# Patient Record
Sex: MALE | URBAN - METROPOLITAN AREA
[De-identification: names, ages, dates, MRNs, and addresses within clinical notes are randomized per-mention and may not be internally consistent; named-entity substitution may affect disease eponyms.]

---

## 2018-09-19 ENCOUNTER — APPOINTMENT (RX ONLY)
Dept: URBAN - METROPOLITAN AREA CLINIC 86 | Facility: CLINIC | Age: 58
Setting detail: DERMATOLOGY
End: 2018-09-19

## 2018-09-19 DIAGNOSIS — L23.9 ALLERGIC CONTACT DERMATITIS, UNSPECIFIED CAUSE: ICD-10-CM | Status: WORSENING

## 2018-09-19 PROBLEM — L30.9 DERMATITIS, UNSPECIFIED: Status: ACTIVE | Noted: 2018-09-19

## 2018-09-19 PROCEDURE — ? SEPARATE AND IDENTIFIABLE DOCUMENTATION

## 2018-09-19 PROCEDURE — ? PRESCRIPTION

## 2018-09-19 PROCEDURE — ? KOH PREP

## 2018-09-19 PROCEDURE — 96372 THER/PROPH/DIAG INJ SC/IM: CPT

## 2018-09-19 PROCEDURE — 99203 OFFICE O/P NEW LOW 30 MIN: CPT | Mod: 25

## 2018-09-19 PROCEDURE — ? ADDITIONAL NOTES

## 2018-09-19 PROCEDURE — ? COUNSELING

## 2018-09-19 PROCEDURE — ? INTRAMUSCULAR KENALOG

## 2018-09-19 RX ORDER — TRIAMCINOLONE ACETONIDE 1 MG/G
CREAM TOPICAL
Qty: 1 | Refills: 1 | Status: ERX | COMMUNITY
Start: 2018-09-19

## 2018-09-19 RX ADMIN — TRIAMCINOLONE ACETONIDE: 1 CREAM TOPICAL at 00:00

## 2018-09-19 ASSESSMENT — LOCATION SIMPLE DESCRIPTION DERM
LOCATION SIMPLE: ABDOMEN
LOCATION SIMPLE: SCALP
LOCATION SIMPLE: RIGHT LOWER BACK
LOCATION SIMPLE: RIGHT BUTTOCK
LOCATION SIMPLE: POSTERIOR NECK

## 2018-09-19 ASSESSMENT — LOCATION ZONE DERM
LOCATION ZONE: NECK
LOCATION ZONE: SCALP
LOCATION ZONE: TRUNK

## 2018-09-19 ASSESSMENT — LOCATION DETAILED DESCRIPTION DERM
LOCATION DETAILED: RIGHT INFERIOR POSTERIOR NECK
LOCATION DETAILED: RIGHT BUTTOCK
LOCATION DETAILED: RIGHT INFERIOR LATERAL LOWER BACK
LOCATION DETAILED: LEFT LATERAL ABDOMEN
LOCATION DETAILED: RIGHT CENTRAL FRONTAL SCALP

## 2018-09-19 ASSESSMENT — PAIN INTENSITY VAS: HOW INTENSE IS YOUR PAIN 0 BEING NO PAIN, 10 BEING THE MOST SEVERE PAIN POSSIBLE?: NO PAIN

## 2018-09-19 ASSESSMENT — SEVERITY ASSESSMENT: SEVERITY: MODERATE

## 2018-09-19 NOTE — PROCEDURE: MIPS QUALITY
Quality 111:Pneumonia Vaccination Status For Older Adults: Pneumococcal Vaccination not Administered or Previously Received, Reason not Otherwise Specified
Quality 110: Preventive Care And Screening: Influenza Immunization: Influenza Immunization Administered during Influenza season
Detail Level: Detailed
Quality 130: Documentation Of Current Medications In The Medical Record: Current Medications Documented

## 2018-09-19 NOTE — PROCEDURE: INTRAMUSCULAR KENALOG
Kenalog Preparation: kenalog
Add Option For Additional Mediation: No
Total Volume (Ccs): 1.3
Concentration (Mg/Ml): 40.0
Consent: The risks of atrophy were reviewed with the patient.
Detail Level: None
Concentration (Mg/Ml) Of Additional Medication: 2.5

## 2018-09-19 NOTE — PROCEDURE: ADDITIONAL NOTES
Detail Level: Simple
Additional Notes: Patient has an asymmetric rash and showed negative findings on the KOH for scabies. His partner has had scabies in the past, and she is not itching despite close physical contact for the last month. He will return in one month if he is not clear, and will return sooner if he worsens.

## 2018-10-03 ENCOUNTER — APPOINTMENT (RX ONLY)
Dept: URBAN - METROPOLITAN AREA CLINIC 86 | Facility: CLINIC | Age: 58
Setting detail: DERMATOLOGY
End: 2018-10-03

## 2018-10-03 DIAGNOSIS — B86 SCABIES: ICD-10-CM

## 2018-10-03 PROBLEM — L30.9 DERMATITIS, UNSPECIFIED: Status: ACTIVE | Noted: 2018-10-03

## 2018-10-03 PROCEDURE — ? TREATMENT REGIMEN

## 2018-10-03 PROCEDURE — 99213 OFFICE O/P EST LOW 20 MIN: CPT

## 2018-10-03 PROCEDURE — ? COUNSELING

## 2018-10-03 PROCEDURE — ? PRESCRIPTION

## 2018-10-03 RX ORDER — TRIAMCINOLONE ACETONIDE 1 MG/G
CREAM TOPICAL
Qty: 1 | Refills: 0 | Status: ERX

## 2018-10-03 RX ORDER — PERMETHRIN 50 MG/G
CREAM TOPICAL
Qty: 1 | Refills: 0 | Status: ERX | COMMUNITY
Start: 2018-10-03

## 2018-10-03 RX ADMIN — PERMETHRIN: 50 CREAM TOPICAL at 00:00

## 2018-10-03 ASSESSMENT — LOCATION DETAILED DESCRIPTION DERM
LOCATION DETAILED: LEFT AXILLARY VAULT
LOCATION DETAILED: PERIUMBILICAL SKIN
LOCATION DETAILED: RIGHT PROXIMAL POSTERIOR THIGH
LOCATION DETAILED: LEFT PROXIMAL POSTERIOR THIGH
LOCATION DETAILED: RIGHT AXILLARY VAULT
LOCATION DETAILED: LEFT PROXIMAL DORSAL FOREARM
LOCATION DETAILED: RIGHT PROXIMAL DORSAL FOREARM

## 2018-10-03 ASSESSMENT — LOCATION SIMPLE DESCRIPTION DERM
LOCATION SIMPLE: LEFT FOREARM
LOCATION SIMPLE: LEFT POSTERIOR THIGH
LOCATION SIMPLE: LEFT AXILLARY VAULT
LOCATION SIMPLE: RIGHT AXILLARY VAULT
LOCATION SIMPLE: RIGHT FOREARM
LOCATION SIMPLE: RIGHT POSTERIOR THIGH
LOCATION SIMPLE: ABDOMEN

## 2018-10-03 ASSESSMENT — LOCATION ZONE DERM
LOCATION ZONE: LEG
LOCATION ZONE: ARM
LOCATION ZONE: TRUNK
LOCATION ZONE: AXILLAE

## 2018-10-03 NOTE — PROCEDURE: TREATMENT REGIMEN
Continue Regimen: TAC 0.1% cream bid weekdays
Detail Level: Zone
Otc Regimen: Sarna anti itch lotion
Plan: Dermatographism negative.\\nWash bedding in warm water. Repeat in 1 week
Initiate Treatment: Permethrin 5% cream - rx given to wife as well

## 2018-10-10 ENCOUNTER — APPOINTMENT (RX ONLY)
Dept: URBAN - METROPOLITAN AREA CLINIC 86 | Facility: CLINIC | Age: 58
Setting detail: DERMATOLOGY
End: 2018-10-10

## 2018-10-10 DIAGNOSIS — B09 UNSPECIFIED VIRAL INFECTION CHARACTERIZED BY SKIN AND MUCOUS MEMBRANE LESIONS: ICD-10-CM

## 2018-10-10 PROBLEM — L30.9 DERMATITIS, UNSPECIFIED: Status: ACTIVE | Noted: 2018-10-10

## 2018-10-10 PROCEDURE — 11100: CPT

## 2018-10-10 PROCEDURE — ? COUNSELING

## 2018-10-10 PROCEDURE — ? BIOPSY BY PUNCH METHOD

## 2018-10-10 PROCEDURE — ? TREATMENT REGIMEN

## 2018-10-10 ASSESSMENT — LOCATION ZONE DERM
LOCATION ZONE: TRUNK
LOCATION ZONE: LEG

## 2018-10-10 ASSESSMENT — LOCATION SIMPLE DESCRIPTION DERM
LOCATION SIMPLE: CHEST
LOCATION SIMPLE: RIGHT THIGH

## 2018-10-10 ASSESSMENT — LOCATION DETAILED DESCRIPTION DERM
LOCATION DETAILED: RIGHT ANTERIOR DISTAL THIGH
LOCATION DETAILED: LEFT LATERAL SUPERIOR CHEST

## 2018-10-10 NOTE — PROCEDURE: BIOPSY BY PUNCH METHOD
Was A Bandage Applied: Yes
Detail Level: Detailed
Render Post-Care Instructions In Note?: no
Anesthesia Volume In Cc (Will Not Render If 0): 0.5
Billing Type: Third-Party Bill
Suture Removal: 14 days
Biopsy Type: H and E
Wound Care: Polysporin ointment
X Size Of Lesion In Cm (Optional): 0
Anesthesia Type: 1% lidocaine with epinephrine
Punch Size In Mm: 3
Hemostasis: None
Dressing: bandage
Consent: Verbal consent was obtained and risks were reviewed including but not limited to scarring, infection, bleeding, scabbing, incomplete removal, nerve damage and allergy to anesthesia.
Post-Care Instructions: I reviewed with the patient in detail post-care instructions. Patient is to keep the biopsy site dry overnight, and then apply Vaseline daily until healed.
Home Suture Removal Text: Patient was provided a home suture removal kit and will remove their sutures at home.  If they have any questions or difficulties they will call the office.
Epidermal Sutures: 5-0 Ethilon
Lab: [id number]
Notification Instructions: Patient will be notified of biopsy results. However, patient instructed to call the office if not contacted within 2 weeks.

## 2018-10-16 ENCOUNTER — APPOINTMENT (RX ONLY)
Dept: URBAN - METROPOLITAN AREA CLINIC 86 | Facility: CLINIC | Age: 58
Setting detail: DERMATOLOGY
End: 2018-10-16

## 2018-10-16 DIAGNOSIS — B09 UNSPECIFIED VIRAL INFECTION CHARACTERIZED BY SKIN AND MUCOUS MEMBRANE LESIONS: ICD-10-CM | Status: IMPROVED

## 2018-10-16 DIAGNOSIS — Z48.02 ENCOUNTER FOR REMOVAL OF SUTURES: ICD-10-CM

## 2018-10-16 PROBLEM — L30.9 DERMATITIS, UNSPECIFIED: Status: ACTIVE | Noted: 2018-10-16

## 2018-10-16 PROCEDURE — ? SUTURE REMOVAL (GLOBAL PERIOD)

## 2018-10-16 PROCEDURE — 99213 OFFICE O/P EST LOW 20 MIN: CPT

## 2018-10-16 PROCEDURE — ? COUNSELING

## 2018-10-16 PROCEDURE — ? TREATMENT REGIMEN

## 2018-10-16 ASSESSMENT — LOCATION ZONE DERM
LOCATION ZONE: LEG
LOCATION ZONE: TRUNK

## 2018-10-16 ASSESSMENT — LOCATION SIMPLE DESCRIPTION DERM
LOCATION SIMPLE: RIGHT THIGH
LOCATION SIMPLE: CHEST

## 2018-10-16 ASSESSMENT — LOCATION DETAILED DESCRIPTION DERM
LOCATION DETAILED: LEFT LATERAL SUPERIOR CHEST
LOCATION DETAILED: RIGHT ANTERIOR DISTAL THIGH

## 2018-10-16 ASSESSMENT — PAIN INTENSITY VAS: HOW INTENSE IS YOUR PAIN 0 BEING NO PAIN, 10 BEING THE MOST SEVERE PAIN POSSIBLE?: NO PAIN

## 2018-10-16 NOTE — PROCEDURE: TREATMENT REGIMEN
Otc Regimen: Sarna lotion
Detail Level: Zone
Plan: Discussed patch testing if not improving
Continue Regimen: TAC 0.1% Cream BID with wet wraps

## 2018-10-16 NOTE — PROCEDURE: SUTURE REMOVAL (GLOBAL PERIOD)
Add 46543 Cpt? (Important Note: In 2017 The Use Of 51256 Is Being Tracked By Cms To Determine Future Global Period Reimbursement For Global Periods): no
Detail Level: Detailed

## 2018-12-26 ENCOUNTER — RX ONLY (OUTPATIENT)
Age: 58
Setting detail: RX ONLY
End: 2018-12-26

## 2018-12-26 ENCOUNTER — APPOINTMENT (RX ONLY)
Dept: URBAN - METROPOLITAN AREA CLINIC 86 | Facility: CLINIC | Age: 58
Setting detail: DERMATOLOGY
End: 2018-12-26

## 2018-12-26 DIAGNOSIS — L259 CONTACT DERMATITIS AND OTHER ECZEMA, UNSPECIFIED CAUSE: ICD-10-CM

## 2018-12-26 PROBLEM — L23.9 ALLERGIC CONTACT DERMATITIS, UNSPECIFIED CAUSE: Status: ACTIVE | Noted: 2018-12-26

## 2018-12-26 PROCEDURE — ? PRESCRIPTION

## 2018-12-26 PROCEDURE — ? COUNSELING

## 2018-12-26 PROCEDURE — ? TREATMENT REGIMEN

## 2018-12-26 PROCEDURE — 99213 OFFICE O/P EST LOW 20 MIN: CPT

## 2018-12-26 RX ORDER — BETAMETHASONE DIPROPIONATE 0.5 MG/G
CREAM TOPICAL
Qty: 30 | Refills: 0 | Status: ERX | COMMUNITY
Start: 2018-12-26

## 2018-12-26 RX ORDER — BETAMETHASONE DIPROPIONATE 0.5 MG/G
CREAM TOPICAL
Qty: 30 | Refills: 0 | Status: ERX

## 2018-12-26 RX ADMIN — BETAMETHASONE DIPROPIONATE: 0.5 CREAM TOPICAL at 15:41

## 2018-12-26 ASSESSMENT — LOCATION DETAILED DESCRIPTION DERM
LOCATION DETAILED: LEFT INFERIOR MEDIAL LOWER BACK
LOCATION DETAILED: RIGHT INFERIOR MEDIAL LOWER BACK
LOCATION DETAILED: HAIR

## 2018-12-26 ASSESSMENT — LOCATION SIMPLE DESCRIPTION DERM
LOCATION SIMPLE: HAIR
LOCATION SIMPLE: RIGHT LOWER BACK
LOCATION SIMPLE: LEFT LOWER BACK

## 2018-12-26 ASSESSMENT — LOCATION ZONE DERM
LOCATION ZONE: SCALP
LOCATION ZONE: TRUNK

## 2018-12-26 ASSESSMENT — SEVERITY ASSESSMENT: SEVERITY: MILD TO MODERATE

## 2018-12-26 ASSESSMENT — PAIN INTENSITY VAS: HOW INTENSE IS YOUR PAIN 0 BEING NO PAIN, 10 BEING THE MOST SEVERE PAIN POSSIBLE?: NO PAIN

## 2018-12-26 NOTE — PROCEDURE: TREATMENT REGIMEN
Initiate Treatment: Betamethasone dipropionate cream BID x 2 wks then change back to TAC 0.1% Cream BID weekdays only
Plan: Change hair products to Free and Clear \\nChange to Dove bar soap for sensitive skin\\nPatch test scheduled in 2 wks\\nVery suspicious of hair products, including hair dye.
Detail Level: Zone

## 2019-01-07 ENCOUNTER — APPOINTMENT (RX ONLY)
Dept: URBAN - METROPOLITAN AREA CLINIC 86 | Facility: CLINIC | Age: 59
Setting detail: DERMATOLOGY
End: 2019-01-07

## 2019-01-07 DIAGNOSIS — L259 CONTACT DERMATITIS AND OTHER ECZEMA, UNSPECIFIED CAUSE: ICD-10-CM

## 2019-01-07 PROBLEM — L23.9 ALLERGIC CONTACT DERMATITIS, UNSPECIFIED CAUSE: Status: ACTIVE | Noted: 2019-01-07

## 2019-01-07 PROCEDURE — ? PATCH TESTING

## 2019-01-07 PROCEDURE — 95044 PATCH/APPLICATION TESTS: CPT

## 2019-01-07 PROCEDURE — ? COUNSELING

## 2019-01-07 ASSESSMENT — LOCATION DETAILED DESCRIPTION DERM: LOCATION DETAILED: LEFT MID-UPPER BACK

## 2019-01-07 ASSESSMENT — LOCATION SIMPLE DESCRIPTION DERM: LOCATION SIMPLE: LEFT UPPER BACK

## 2019-01-07 ASSESSMENT — LOCATION ZONE DERM: LOCATION ZONE: TRUNK

## 2019-01-07 NOTE — PROCEDURE: PATCH TESTING
Number Of Patches (Maximum Allowable Per Dos By Cms Is 90): 80
Consent: Written consent obtained, risks reviewed including but not limited to rash, itching, allergic reaction, systemic rash, remote possiblity of anaphylaxis to allergen.
Detail Level: None
Post-Care Instructions: I reviewed with the patient in detail post-care instructions. Patient should not sweat, pick at, or get the patches wet for 48 hours.

## 2019-01-09 ENCOUNTER — APPOINTMENT (RX ONLY)
Dept: URBAN - METROPOLITAN AREA CLINIC 86 | Facility: CLINIC | Age: 59
Setting detail: DERMATOLOGY
End: 2019-01-09

## 2019-01-09 DIAGNOSIS — L259 CONTACT DERMATITIS AND OTHER ECZEMA, UNSPECIFIED CAUSE: ICD-10-CM

## 2019-01-09 PROBLEM — L23.9 ALLERGIC CONTACT DERMATITIS, UNSPECIFIED CAUSE: Status: ACTIVE | Noted: 2019-01-09

## 2019-01-09 PROCEDURE — 99212 OFFICE O/P EST SF 10 MIN: CPT

## 2019-01-09 PROCEDURE — ? CORE ACDS PATCH TEST READING

## 2019-01-09 NOTE — PROCEDURE: CORE ACDS PATCH TEST READING
Allergen 62 Reaction: no reaction
Name Of Allergen 60: Benzalkonium chloride
Name Of Allergen 47: Lavender absolute
Detail Level: Zone
Name Of Allergen 55: Benzophenone-3 (2-Hydroxy-4-methoxybenzophenone)
Name Of Allergen 46: Methyl methacrylate
Name Of Allergen 76: Disperse Orange 3
Name Of Allergen 39: Polymyxin B
Name Of Allergen 15: Carba Mix
Name Of Allergen 9: Methylisothiazolinone
Name Of Allergen 37: Propylene glycol
Name Of Allergen 74: Ethyl cyanoacrylate
Name Of Allergen 43: HEMA Hydroxyethyl methacrylate
Name Of Allergen 32: Mercaptobenzothiazole
Name Of Allergen 51: Tea tree oil
What Reading Time Point?: 48 hour
Name Of Allergen 56: Tosylamide formaldehyde resin
Allergen 10 Reaction: +/-
Name Of Allergen 62: Sodium benzoate
Name Of Allergen 28: Gold sodium thiosulfate
Name Of Allergen 35: Disperse Blue 106/124 Mix
Name Of Allergen 73: Amidoamine
Name Of Allergen 45: Decyl glucoside
Name Of Allergen 16: Black Rubber Mix
Number Of Patches Read: 80
Name Of Allergen 61: Benzophenone-4 (2-Hydroxy-4-methoxybenzophenone-5)
Name Of Allergen 38: Iodopropynyl butylcarbamate
Name Of Allergen 66: Ethyleneurea melanine-formaldehyde
Name Of Allergen 18: Quaternium 15 (Dowicil 200)
Name Of Allergen 80: Benzyl alcohol
Name Of Allergen 14: Epoxy Resin (Bisphenol A)
Show Negative Results In The Note?: Yes
Name Of Allergen 49: Tocopherol
Name Of Allergen 21: Formaldehyde
Name Of Allergen 22: Mercapto Mix
Name Of Allergen 10: Balsam of [place] (Myroxylon pereirae)
Name Of Allergen 31: Hydrocortisone-17-butyrate
Name Of Allergen 57: Sesquiterpene lactone Mix
Name Of Allergen 77: Benzoic acid
Name Of Allergen 6: Fragrance Mix I
Name Of Allergen 64: Ylang-Ylang oil (Cananga odorata)
Name Of Allergen 48: Cinnamal - Cinnamic aldehyde
Name Of Allergen 52: Chlorhexidine digluconate
Name Of Allergen 67: Sorbitan sesquioleate
Name Of Allergen 13: s-gjpy-Mgqhqbdkeop formaldehyde resin
Name Of Allergen 4: Potassium dichromate
Name Of Allergen 7: Colophony
Show Allergen Counseling In The Note?: No
Name Of Allergen 68: n,n-Diphenylguanidine
Name Of Allergen 63: Sorbic acid
Name Of Allergen 1: Nickel
Name Of Allergen 2: Lanolin (Amerchol 101)
Name Of Allergen 70: Ethylhexylglycerin
Name Of Allergen 36: Lidocaine
Name Of Allergen 25: Diazolidinyl urea (Germall II)
Name Of Allergen 34: Fragrance Mix II
Name Of Allergen 8: Paraben Mix
Name Of Allergen 58: Cocamide BLAYNE (coconut BLAYNE)
Name Of Allergen 5: DMDM Hydantoin
Name Of Allergen 65: Compositae Mix
Name Of Allergen 24: Thiuram Mix
Name Of Allergen 75: Phenoxyethanol
Name Of Allergen 11: Ethylenediamine dihydrochloride
Name Of Allergen 27: Eifstycsgt-[id number]-damqscxr
Name Of Allergen 71: Triamcinolone
Name Of Allergen 59: 4-Chloro-3-cresol (PCMC)
Name Of Allergen 78: BHT (2-Ditert-butyl-4-cresol)
Name Of Allergen 53: Propolis
Name Of Allergen 40: Cocamidopropyl betaine
Name Of Allergen 12: Cobalt chloride
Name Of Allergen 30: Budesonide
Name Of Allergen 33: Bacitracin
Name Of Allergen 26: Benzocaine
Name Of Allergen 44: Oleamidopropyl dimethylamine
Name Of Allergen 54: Chloroxylenol (PCMX)
Name Of Allergen 3: Neomycin
Name Of Allergen 19: MDBGN (Methyldibromoglutaronitrile)
Name Of Allergen 17: Methylchlorisothiazolinone/Methylisothiazolinone
Name Of Allergen 41: Mixed dialkyl thioureas
Name Of Allergen 69: Cetyl steryl alcohol
Name Of Allergen 72: Clobetasol-17 propionate
Name Of Allergen 42: 3-(Dimethylamino)-propylamine
Name Of Allergen 20: p-Phenylenediamine
Name Of Allergen 50: Ethyl acrylate
Name Of Allergen 29: Imidazolidinyl urea (Germal 115)
Name Of Allergen 23: Bronopol 2-Bromo-2-nitropropane-1,3-diol
Name Of Allergen 79: 2-Ethylhexyl-4-methoxycinnamate (Parsol MCX)

## 2019-01-10 ENCOUNTER — APPOINTMENT (RX ONLY)
Dept: URBAN - METROPOLITAN AREA CLINIC 86 | Facility: CLINIC | Age: 59
Setting detail: DERMATOLOGY
End: 2019-01-10

## 2019-01-10 DIAGNOSIS — L259 CONTACT DERMATITIS AND OTHER ECZEMA, UNSPECIFIED CAUSE: ICD-10-CM

## 2019-01-10 PROBLEM — L23.9 ALLERGIC CONTACT DERMATITIS, UNSPECIFIED CAUSE: Status: ACTIVE | Noted: 2019-01-10

## 2019-01-10 PROCEDURE — ? ADDITIONAL NOTES

## 2019-01-10 PROCEDURE — ? COUNSELING ALLERGENS

## 2019-01-10 PROCEDURE — ? CORE ACDS PATCH TEST READING

## 2019-01-10 PROCEDURE — 99213 OFFICE O/P EST LOW 20 MIN: CPT

## 2019-01-10 NOTE — PROCEDURE: CORE ACDS PATCH TEST READING
Name Of Allergen 36: Lidocaine
Allergen 83 Reaction: no reaction
Name Of Allergen 65: Compositae Mix
Name Of Allergen 29: Imidazolidinyl urea (Germal 115)
Name Of Allergen 12: Cobalt chloride
Name Of Allergen 40: Cocamidopropyl betaine
Name Of Allergen 16: Black Rubber Mix
Name Of Allergen 53: Propolis
What Reading Time Point?: 72 hour
Name Of Allergen 57: Sesquiterpene lactone Mix
Name Of Allergen 77: Benzoic acid
Name Of Allergen 72: Clobetasol-17 propionate
Name Of Allergen 73: Amidoamine
Name Of Allergen 76: Disperse Orange 3
Name Of Allergen 18: Quaternium 15 (Dowicil 200)
Name Of Allergen 10: Balsam of [place] (Myroxylon pereirae)
Number Of Patches Read: 80
Name Of Allergen 71: Triamcinolone
Name Of Allergen 34: Fragrance Mix II
Name Of Allergen 47: Lavender absolute
Name Of Allergen 14: Epoxy Resin (Bisphenol A)
Name Of Allergen 3: Neomycin
Name Of Allergen 35: Disperse Blue 106/124 Mix
Name Of Allergen 67: Sorbitan sesquioleate
Allergen 78 Reaction: 1+
Name Of Allergen 19: MDBGN (Methyldibromoglutaronitrile)
Name Of Allergen 74: Ethyl cyanoacrylate
Name Of Allergen 54: Chloroxylenol (PCMX)
Name Of Allergen 4: Potassium dichromate
Name Of Allergen 23: Bronopol 2-Bromo-2-nitropropane-1,3-diol
Name Of Allergen 11: Ethylenediamine dihydrochloride
Show Allergen Counseling In The Note?: Yes
Name Of Allergen 62: Sodium benzoate
Name Of Allergen 50: Ethyl acrylate
Name Of Allergen 38: Iodopropynyl butylcarbamate
Name Of Allergen 37: Propylene glycol
Name Of Allergen 21: Formaldehyde
Name Of Allergen 58: Cocamide BLAYNE (coconut BLAYNE)
Name Of Allergen 78: BHT (2-Ditert-butyl-4-cresol)
Name Of Allergen 61: Benzophenone-4 (2-Hydroxy-4-methoxybenzophenone-5)
Name Of Allergen 1: Nickel
Name Of Allergen 24: Thiuram Mix
Name Of Allergen 46: Methyl methacrylate
Name Of Allergen 26: Benzocaine
Name Of Allergen 25: Diazolidinyl urea (Germall II)
Name Of Allergen 55: Benzophenone-3 (2-Hydroxy-4-methoxybenzophenone)
Name Of Allergen 39: Polymyxin B
Detail Level: Zone
Name Of Allergen 75: Phenoxyethanol
Name Of Allergen 17: Methylchlorisothiazolinone/Methylisothiazolinone
Name Of Allergen 13: i-ecek-Xwefeirbntl formaldehyde resin
Name Of Allergen 20: p-Phenylenediamine
Name Of Allergen 32: Mercaptobenzothiazole
Name Of Allergen 66: Ethyleneurea melanine-formaldehyde
Name Of Allergen 43: HEMA Hydroxyethyl methacrylate
Name Of Allergen 30: Budesonide
Name Of Allergen 70: Ethylhexylglycerin
Name Of Allergen 69: Cetyl steryl alcohol
Name Of Allergen 44: Oleamidopropyl dimethylamine
Name Of Allergen 51: Tea tree oil
Name Of Allergen 68: n,n-Diphenylguanidine
Name Of Allergen 48: Cinnamal - Cinnamic aldehyde
Name Of Allergen 79: 2-Ethylhexyl-4-methoxycinnamate (Parsol MCX)
Name Of Allergen 42: 3-(Dimethylamino)-propylamine
Name Of Allergen 9: Methylisothiazolinone
Name Of Allergen 22: Mercapto Mix
Name Of Allergen 63: Sorbic acid
Name Of Allergen 49: Tocopherol
Name Of Allergen 41: Mixed dialkyl thioureas
Name Of Allergen 59: 4-Chloro-3-cresol (PCMC)
Name Of Allergen 7: Colophony
Name Of Allergen 64: Ylang-Ylang oil (Cananga odorata)
Name Of Allergen 60: Benzalkonium chloride
Name Of Allergen 6: Fragrance Mix I
Name Of Allergen 27: [id number]
Name Of Allergen 15: Carba Mix
Name Of Allergen 33: Bacitracin
Name Of Allergen 80: Benzyl alcohol
Name Of Allergen 31: Hydrocortisone-17-butyrate
Name Of Allergen 52: Chlorhexidine digluconate
Name Of Allergen 5: DMDM Hydantoin
Name Of Allergen 28: Gold sodium thiosulfate
Name Of Allergen 45: Decyl glucoside
Name Of Allergen 8: Paraben Mix
Name Of Allergen 56: Tosylamide formaldehyde resin
Name Of Allergen 2: Lanolin (Amerchol 101)

## 2019-01-10 NOTE — PROCEDURE: ADDITIONAL NOTES
Additional Notes: [facility]’s database list generated. Patient education and avoidance about allergens.
Detail Level: Simple

## 2019-02-14 ENCOUNTER — APPOINTMENT (RX ONLY)
Dept: URBAN - METROPOLITAN AREA CLINIC 86 | Facility: CLINIC | Age: 59
Setting detail: DERMATOLOGY
End: 2019-02-14

## 2019-02-14 DIAGNOSIS — L663 OTHER SPECIFIED DISEASES OF HAIR AND HAIR FOLLICLES: ICD-10-CM

## 2019-02-14 DIAGNOSIS — L73.9 FOLLICULAR DISORDER, UNSPECIFIED: ICD-10-CM

## 2019-02-14 DIAGNOSIS — L738 OTHER SPECIFIED DISEASES OF HAIR AND HAIR FOLLICLES: ICD-10-CM

## 2019-02-14 DIAGNOSIS — L259 CONTACT DERMATITIS AND OTHER ECZEMA, UNSPECIFIED CAUSE: ICD-10-CM

## 2019-02-14 PROBLEM — L30.9 DERMATITIS, UNSPECIFIED: Status: ACTIVE | Noted: 2019-02-14

## 2019-02-14 PROBLEM — L23.9 ALLERGIC CONTACT DERMATITIS, UNSPECIFIED CAUSE: Status: ACTIVE | Noted: 2019-02-14

## 2019-02-14 PROCEDURE — ? PRESCRIPTION

## 2019-02-14 PROCEDURE — ? COUNSELING

## 2019-02-14 PROCEDURE — 99213 OFFICE O/P EST LOW 20 MIN: CPT

## 2019-02-14 PROCEDURE — ? TREATMENT REGIMEN

## 2019-02-14 RX ORDER — DOXYCYCLINE HYCLATE 100 MG/1
CAPSULE, GELATIN COATED ORAL
Qty: 20 | Refills: 0 | Status: ERX | COMMUNITY
Start: 2019-02-14

## 2019-02-14 RX ORDER — FLUOCINONIDE 0.5 MG/ML
SOLUTION TOPICAL
Qty: 1 | Refills: 2 | Status: ERX | COMMUNITY
Start: 2019-02-14

## 2019-02-14 RX ADMIN — DOXYCYCLINE HYCLATE: 100 CAPSULE, GELATIN COATED ORAL at 21:49

## 2019-02-14 RX ADMIN — FLUOCINONIDE: 0.5 SOLUTION TOPICAL at 21:49

## 2019-02-14 ASSESSMENT — LOCATION SIMPLE DESCRIPTION DERM: LOCATION SIMPLE: POSTERIOR SCALP

## 2019-02-14 ASSESSMENT — LOCATION ZONE DERM: LOCATION ZONE: SCALP

## 2019-02-14 ASSESSMENT — PAIN INTENSITY VAS: HOW INTENSE IS YOUR PAIN 0 BEING NO PAIN, 10 BEING THE MOST SEVERE PAIN POSSIBLE?: NO PAIN

## 2019-02-14 ASSESSMENT — LOCATION DETAILED DESCRIPTION DERM: LOCATION DETAILED: MID-OCCIPITAL SCALP

## 2019-02-14 ASSESSMENT — SEVERITY ASSESSMENT: SEVERITY: MILD

## 2019-02-14 NOTE — PROCEDURE: TREATMENT REGIMEN
Detail Level: Zone
Continue Regimen: continue using free and clear shampoo.
Initiate Treatment: patient to use fluocinonide solution twice daily for 2 weeks then use on weekdays only and take a round of antibiotic Doxy 100 mg bid x 10 days

## 2019-02-18 ENCOUNTER — RX ONLY (OUTPATIENT)
Age: 59
Setting detail: RX ONLY
End: 2019-02-18

## 2019-02-18 RX ORDER — CLOBETASOL PROPIONATE 0.46 MG/ML
SOLUTION TOPICAL
Qty: 1 | Refills: 2 | Status: ERX | COMMUNITY
Start: 2019-02-18